# Patient Record
Sex: MALE | Race: WHITE | NOT HISPANIC OR LATINO | Employment: UNEMPLOYED | URBAN - METROPOLITAN AREA
[De-identification: names, ages, dates, MRNs, and addresses within clinical notes are randomized per-mention and may not be internally consistent; named-entity substitution may affect disease eponyms.]

---
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
## 2023-01-24 DIAGNOSIS — R03.0 ELEVATED BLOOD PRESSURE READING: Primary | ICD-10-CM

## 2023-02-01 ENCOUNTER — OFFICE VISIT (OUTPATIENT)
Dept: PEDIATRIC CARDIOLOGY | Facility: CLINIC | Age: 18
End: 2023-02-01
Payer: MEDICAID

## 2023-02-01 VITALS
RESPIRATION RATE: 18 BRPM | WEIGHT: 196.13 LBS | HEART RATE: 110 BPM | HEIGHT: 74 IN | DIASTOLIC BLOOD PRESSURE: 84 MMHG | BODY MASS INDEX: 25.17 KG/M2 | SYSTOLIC BLOOD PRESSURE: 158 MMHG | OXYGEN SATURATION: 99 %

## 2023-02-01 DIAGNOSIS — F41.9 ANXIETY: Primary | ICD-10-CM

## 2023-02-01 DIAGNOSIS — I10 HYPERTENSION, UNSPECIFIED TYPE: ICD-10-CM

## 2023-02-01 DIAGNOSIS — R94.31 NONSPECIFIC ABNORMAL ELECTROCARDIOGRAM (ECG) (EKG): ICD-10-CM

## 2023-02-01 PROCEDURE — 3079F DIAST BP 80-89 MM HG: CPT | Mod: CPTII,S$GLB,, | Performed by: PHYSICIAN ASSISTANT

## 2023-02-01 PROCEDURE — 3008F BODY MASS INDEX DOCD: CPT | Mod: CPTII,S$GLB,, | Performed by: PHYSICIAN ASSISTANT

## 2023-02-01 PROCEDURE — 1160F RVW MEDS BY RX/DR IN RCRD: CPT | Mod: CPTII,S$GLB,, | Performed by: PHYSICIAN ASSISTANT

## 2023-02-01 PROCEDURE — 3077F PR MOST RECENT SYSTOLIC BLOOD PRESSURE >= 140 MM HG: ICD-10-PCS | Mod: CPTII,S$GLB,, | Performed by: PHYSICIAN ASSISTANT

## 2023-02-01 PROCEDURE — 99204 OFFICE O/P NEW MOD 45 MIN: CPT | Mod: 25,S$GLB,, | Performed by: PHYSICIAN ASSISTANT

## 2023-02-01 PROCEDURE — 3079F PR MOST RECENT DIASTOLIC BLOOD PRESSURE 80-89 MM HG: ICD-10-PCS | Mod: CPTII,S$GLB,, | Performed by: PHYSICIAN ASSISTANT

## 2023-02-01 PROCEDURE — 1159F MED LIST DOCD IN RCRD: CPT | Mod: CPTII,S$GLB,, | Performed by: PHYSICIAN ASSISTANT

## 2023-02-01 PROCEDURE — 93000 EKG 12-LEAD: ICD-10-PCS | Mod: S$GLB,,, | Performed by: PEDIATRICS

## 2023-02-01 PROCEDURE — 1160F PR REVIEW ALL MEDS BY PRESCRIBER/CLIN PHARMACIST DOCUMENTED: ICD-10-PCS | Mod: CPTII,S$GLB,, | Performed by: PHYSICIAN ASSISTANT

## 2023-02-01 PROCEDURE — 3008F PR BODY MASS INDEX (BMI) DOCUMENTED: ICD-10-PCS | Mod: CPTII,S$GLB,, | Performed by: PHYSICIAN ASSISTANT

## 2023-02-01 PROCEDURE — 1159F PR MEDICATION LIST DOCUMENTED IN MEDICAL RECORD: ICD-10-PCS | Mod: CPTII,S$GLB,, | Performed by: PHYSICIAN ASSISTANT

## 2023-02-01 PROCEDURE — 99204 PR OFFICE/OUTPT VISIT, NEW, LEVL IV, 45-59 MIN: ICD-10-PCS | Mod: 25,S$GLB,, | Performed by: PHYSICIAN ASSISTANT

## 2023-02-01 PROCEDURE — 3077F SYST BP >= 140 MM HG: CPT | Mod: CPTII,S$GLB,, | Performed by: PHYSICIAN ASSISTANT

## 2023-02-01 PROCEDURE — 93000 ELECTROCARDIOGRAM COMPLETE: CPT | Mod: S$GLB,,, | Performed by: PEDIATRICS

## 2023-02-01 RX ORDER — LOSARTAN POTASSIUM 25 MG/1
25 TABLET ORAL 2 TIMES DAILY
Qty: 60 TABLET | Refills: 1 | Status: SHIPPED | OUTPATIENT
Start: 2023-02-01 | End: 2023-04-18

## 2023-02-01 NOTE — PROGRESS NOTES
[facility]  Chinedu Dennison  2005    Chinedu Dennison is a 17 y.o. male presenting for evaluation of HTN.  Chinedu is here today with his MGM.    HPI  Chinedu Dennison presented to his PCP 6/8/21 for STD results. The note states he had HTN but did not want to check his BP or take medication. His BP was 167/100. He was referred here but missed his appointment in June 2021. He returned to his PCP 12/12/22 requesting cardiology referral for HTN. He also had a history of anxiety. His BP was 150/80. He was started 12.5 mg of HCTZ but did not start it.   He reports his BP was first noted to be elevated 3 years ago. He took a BP medication for 4 days but stopped it because he thought his BP was normal at home (140 mmHg systolic).      Chinedu has been overall healthy. No history of kidney infection or kidney disease.  Chinedu has a lot of energy and does not get short of breath with activity.  Denies any recent illness, surgeries, or hospitalizations.    There are no reports of chest pain, chest pain with exertion, cyanosis, exercise intolerance, dyspnea, fatigue, palpitations, syncope, and tachypnea. No other cardiovascular or medical concerns are reported.      Medications:    Current Outpatient Medications   Medication Sig    losartan (COZAAR) 25 MG tablet Take 1 tablet (25 mg total) by mouth 2 (two) times a day.     No current facility-administered medications for this visit.       Allergies:   Review of patient's allergies indicates:   Allergen Reactions    Peanut butter [peanut]      Family History   Problem Relation Age of Onset    No Known Problems Mother     Hypertension Maternal Grandmother 31    Heart attacks under age 50 Maternal Grandmother 52    Hypertension Maternal Grandfather     Anemia Neg Hx     Arrhythmia Neg Hx     Cardiomyopathy Neg Hx     Childhood respiratory disease Neg Hx     Clotting disorder Neg Hx     Congenital heart disease Neg Hx     Deafness Neg Hx     Early death  "Neg Hx     Long QT syndrome Neg Hx     Pacemaker/defibrilator Neg Hx     Premature birth Neg Hx     Seizures Neg Hx     SIDS Neg Hx      Past Medical History:   Diagnosis Date    Hypertension     Respiratory syncytial virus (RSV)      Social History     Social History Narrative    Chinedu live with grandmother, Chinedu entering into the [employer]. No smoking      Past Surgical History:   Procedure Laterality Date    NO PAST SURGERIES       No birth history on file.    There is no immunization history on file for this patient.  Immunizations were reviewed today and if not current, recommend follow up with the PCP for further management.  Past medical history, family history, surgical history, social history updated and reviewed today.     Review of Systems  GENERAL: No fever, chills, fatigability, malaise, or weight loss.  CHEST: Denies CHANDLER, cyanosis, wheezing, cough, sputum production, or SOB.  CARDIOVASCULAR: Denies chest pain, palpitations, diaphoresis, SOB, or reduced exercise tolerance.  Endocrine: Denies polyphagia, polydipsia, or polyuria  Skin: Denies rashes or color change  HENT: Negative for congestion, headaches and sore throat.   ABDOMEN: Appetite fine. No weight loss. Denies diarrhea, abdominal pain, nausea, or vomiting.  PERIPHERAL VASCULAR: No edema, varicosities, or cyanosis.  Musculoskeletal: Negative for muscle weakness and stiffness.  NEUROLOGIC: no dizziness, no history of syncope by report, no headache   Psychiatric/Behavioral: Negative for altered mental status. The patient is not nervous/anxious.   Allergic/Immunologic: Negative for environmental allergies.   : dysuria, hematuria, polyuria    Objective:   BP (!) 158/84   Pulse 110   Resp 18   Ht 6' 2" (1.88 m)   Wt 89 kg (196 lb 1.6 oz)   SpO2 99%   BMI 25.18 kg/m²   Body surface area is 2.16 meters squared.  Blood pressure percentiles are not available for patients who are 18 years or older.    Vitals:    02/01/23 1517 02/01/23 1557 " "  BP: (!) 176/90 (!) 158/84   BP Location: Right arm    Patient Position: Lying    BP Method: X-Large (Manual)    Pulse: 110    Resp: 18    SpO2: 99%    Weight: 89 kg (196 lb 1.6 oz)    Height: 6' 2" (1.88 m)        Physical Exam  GENERAL: Awake, well-developed well-nourished, no apparent distress  HEENT: mucous membranes moist and pink, normocephalic, no cranial or carotid bruits, sclera anicteric  NECK:  no lymphadenopathy  CHEST: Good air movement, clear to auscultation bilaterally  CARDIOVASCULAR: Quiet precordium, regular rate and rhythm, single S1, split S2, normal P2, No S3 or S4, no rubs or gallops. No clicks or rumbles. No cardiomegaly by palpation. No murmur noted. No flank or abdominal bruits.   ABDOMEN: Soft, nontender nondistended, no hepatosplenomegaly, no aortic bruits  EXTREMITIES: Warm well perfused, 2+ radial/pedal/femoral pulses, capillary refill 2 seconds, no clubbing, cyanosis, or edema  NEURO: Alert and oriented, cooperative with exam, face symmetric, moves all extremities well.  Skin: pink, turgor WNL  Vitals reviewed     Tests:   Today's EKG interpretation by Dr. Augustine reveals:   NSR  Gothic T waves Deep S V2  (Final report in electronic medical record)    CXR:   Dr. Augustine personally reviewed the radiographic images of the chest dated 3/23/21 and the findings are:  Levocardia with a normal heart size, normal pulmonary flow and situs solitus of the abdominal organs and Lateral view is within normal limits    Assessment:  Patient Active Problem List   Diagnosis    Hypertension    Anxiety    Nonspecific abnormal electrocardiogram (ECG) (EKG)       Discussion/ Plan:   Dr. Augustine reviewed history and physical exam. He then performed the physical exam. He discussed the findings with the patient's caregiver(s), and answered all questions. Dr. Augustine and I have reviewed our general guidelines related to cardiac issues with the family.  I instructed them in the event of an emergency to call 911 or go to the " nearest emergency room.  They know to contact the PCP if problems arise or if they are in doubt.    Chinedu has uncontrolled stage 2 HTN . He has been hypertensive for 3 years per report. He is agreeable with work up and treatment because he plans to join the [employer]. Because his BP is stage 2, Dr. Augustine would like to start Losartan 25 mg BID today. Discussed possible adverse reactions including angioedema, cough, etc. Will start HTN work up( see below). Once BP is controlled, will plan for stress test before clearing for normal activity. He should follow a low sodium healthy diet. His EKG nonspecific but likely a normal variant. Will do echo for evaluation.  He will check his BP at home and alert us if consistently elevated.       Normal BP Systolic BP <120 and diastolic BP <80 mmHg   Elevated BP Systolic  to 129 and diastolic BP <80 mmHg   Stage 1 /80 to 139/89 mmHg   Stage 2 HTN ?140/90 mmHg     Follow up with PCP for anxiety.    I spent a total of 45 minutes on the day of the visit.  This includes face to face time and non-face to face time preparing to see the patient (eg, review of tests), obtaining and/or reviewing separately obtained history, documenting clinical information in the electronic or other health record, independently interpreting results and communicating results to the patient/family/caregiver, or care coordinator.     Activity:He can participate in normal age-appropriate activities. He should be allowed to set .his own pace and rest if fatigued.     No endocarditis prophylaxis is recommended in this circumstance.      Medications:    Current Outpatient Medications   Medication Sig    losartan (COZAAR) 25 MG tablet Take 1 tablet (25 mg total) by mouth 2 (two) times a day.     No current facility-administered medications for this visit.        Orders placed this encounter  Orders Placed This Encounter   Procedures    US Doppler Renal Artery and Vein (xpd)    US Kidney    Aldosterone     T4, Free    TSH    Urinalysis    Renin    Comprehensive Metabolic Panel    Lipid Panel    Uric Acid    Pediatric Echo Limited Echo? No       Follow-Up:   Return to clinic 1 week after testing with EKG or sooner if there are any concerns    Sincerely,  Alexandro Augustine MD    Note Contributing Authors:  MD Loren Lane PA-C  02/01/2023    Attestation: Alexandro Augustine MD  I have reviewed the records and agree with the above. I have examined the patient and discussed the findings with the family in attendance. All questions were answered to their satisfaction. I agree with the plan and the follow up instructions.

## 2023-02-01 NOTE — PATIENT INSTRUCTIONS
Alexandro Augustine MD  Pediatric Cardiology  [address] [place] [id number]  [phone]    General Guidelines    Name: Chinedu Dennison                   : 2005    Diagnosis:   1. Anxiety    2. Hypertension, unspecified type    3. Nonspecific abnormal electrocardiogram (ECG) (EKG)        PCP: Lynsey Haque NP  PCP Phone Number: [phone]    If you have an emergency or you think you have an emergency, go to the nearest emergency room!     Breathing too fast, doesnt look right, consistently not eating well, your child needs to be checked. These are general indications that your child is not feeling well. This may be caused by anything, a stomach virus, an ear ache or heart disease, so please call Lynsey Haque NP. If Lynsey Haque NP thinks you need to be checked for your heart, they will let us know.     If your child experiences a rapid or very slow heart rate and has the following symptoms, call Lynsey Haque NP or go to the nearest emergency room.   unexplained chest pain   does not look right   feels like they are going to pass out   actually passes out for unexplained reasons   weakness or fatigue   shortness of breath  or breathing fast   consistent poor feeding     If your child experiences a rapid or very slow heart rate that lasts longer than 30 minutes call Lynsey Haque NP or go to the nearest emergency room.     If your child feels like they are going to pass out - have them sit down or lay down immediately. Raise the feet above the head (prop the feet on a chair or the wall) until the feeling passes. Slowly allow the child to sit, then stand. If the feeling returns, lay back down and start over.     It is very important that you notify Lynsey Haque NP first. Lnysey Haque NP or the ER Physician can reach Dr. Alexandro Augustine at the office or through [facility] at [phone] as needed.    Call our office ([phone]) one week after ALL tests for  results.       Normal BP Systolic BP <120 and diastolic BP <80 mmHg   Elevated BP Systolic  to 129 and diastolic BP <80 mmHg   Stage 1 /80 to 139/89 mmHg   Stage 2 HTN ?140/90 mmHg

## 2023-02-10 DIAGNOSIS — R94.31 NONSPECIFIC ABNORMAL ELECTROCARDIOGRAM (ECG) (EKG): ICD-10-CM

## 2023-02-10 DIAGNOSIS — I10 HYPERTENSION, UNSPECIFIED TYPE: Primary | ICD-10-CM

## 2023-02-10 DIAGNOSIS — F41.9 ANXIETY: ICD-10-CM

## 2023-02-21 ENCOUNTER — TELEPHONE (OUTPATIENT)
Dept: PEDIATRIC CARDIOLOGY | Facility: CLINIC | Age: 18
End: 2023-02-21
Payer: MEDICAID

## 2023-02-21 NOTE — TELEPHONE ENCOUNTER
Called [facility] and was told that they would accept his medicaid plan. Referral faxed for review/scheduling.

## 2023-02-21 NOTE — TELEPHONE ENCOUNTER
----- Message from Loren Jovel PA-C sent at 2/21/2023  4:23 PM CST -----  Please refer to adult nephrology locally for abnormal renal US suspect for renal artery stenosis and HTN. Thanks.

## 2023-02-21 NOTE — TELEPHONE ENCOUNTER
Tried to call again to go over renal US with doppler results. It was suspect for renal artery stenosis. Dr. Augustine would like to refer him to adult nephrology locally since he is an adult.  Also wanted to check on labs to see if they were done. No answer.  Will continue to try to call.       Addendum 2/21/2023. Spoke to Chinedu. Reviewed results and plan.  Will refer to nephrology.  He will call if he does not hear from them about an appointment.  He will have labs done tomorrow.     Message  Received: Today  LITA Cruzy Staff  Please refer to adult nephrology locally for abnormal renal US suspect for renal artery stenosis and HTN. Thanks.     2/14/23  USV DUPLEX ABDOMEN PELVIC OR RETROPERITONEAL LIMITED     REASON FOR STUDY/DIAGNOSIS: I10:Essential (primary) hypertension.     COMPARISON: None.     TECHNIQUE: Real-time grayscale and color Doppler sonographic imaging was performed of the kidneys and urinary bladder. Static images are submitted for review.     FINDINGS:   Right kidney measures 11.1 x 5.0 x 5.5 cm. Left kidney measures 11.8 x 6.4 x 5.4 cm in length. No hydronephrosis. Normal cortical thickness bilaterally. No hydronephrosis. No discrete nephrolithiasis or suspicious renal mass seen sonographically.     Vasculature (peak systolic velocities are reported in centimeters per second):   Abdominal aorta: Patent and normal caliber. Peak systolic velocities 177, 137, and 122 cm/s at the proximal, mid, and distal aspects respectively.     RIGHT KIDNEY:   Renal artery peak systolic velocities 134, 134, and 93 cm/s at the proximal, mid, and distal aspects respectively. Renal-aortic ratio is within normal limits. Renal vein is patent. Interlobar renal artery resistive indices measure 0.28, 0.55, and 0.47 at the upper pole, interpolar, and lower pole respectively.     LEFT KIDNEY:   Renal artery peak systolic velocities 90, 58, and 77 cm/sec at the proximal, mid, and distal aspects respectively.  Renal-aortic ratio is within normal limits. Renal vein is patent. Interlobar renal artery resistive indices measure 0.61, 0.51, and 0.50 at the upper pole, interpolar, and lower pole respectively.      Impression    1.  Nonspecific decreased interlobar renal artery resistive indices (right worse than left) and can be seen with renal artery stenosis, but there is no other sonographic evidence to support this.   2.  No hydronephrosis.            Echo 2/14/23  INTERPRETATION SUMMARY   4 Chambers with normally aligned great vessels   Qualitatively normal chamber sizes   No LVH noted   EF (TEICH) 66, 71:%   MV E/A:2.0   Good LV Function   No LVOTO   No RVOTO   Aortic Valve Normal   Pulmonary Valve Normal   Mitral Valve Normal   Tricuspid Valve Normal   Aortic Root Appears Normal   Aortic Arch Appears Normal   Descending Aorta Appears Normal   No evidence of coarctation of the aorta noted   Desc Ao PG 7 mmHg   RCA and LCA ostia are patent by 2D   Normal MPA & branches are normal   No PPS   3 of 4 pulmonary veins noted draining to LA   No shunts noted   LA qualitatively normal for age   TAPSE 2.2 cm   Physiological TR, PI, MR   RVSP ~10 mmHg   IVC and SVC to RA   Clinical Correlation Suggested   Follow-up Warranted

## 2023-02-23 ENCOUNTER — DOCUMENTATION ONLY (OUTPATIENT)
Dept: PEDIATRIC CARDIOLOGY | Facility: CLINIC | Age: 18
End: 2023-02-23

## 2023-02-23 ENCOUNTER — OFFICE VISIT (OUTPATIENT)
Dept: PEDIATRIC CARDIOLOGY | Facility: CLINIC | Age: 18
End: 2023-02-23
Payer: MEDICAID

## 2023-02-23 VITALS
HEIGHT: 74 IN | HEART RATE: 86 BPM | RESPIRATION RATE: 18 BRPM | WEIGHT: 202.63 LBS | DIASTOLIC BLOOD PRESSURE: 74 MMHG | OXYGEN SATURATION: 98 % | SYSTOLIC BLOOD PRESSURE: 150 MMHG | BODY MASS INDEX: 26 KG/M2

## 2023-02-23 DIAGNOSIS — F41.9 ANXIETY: ICD-10-CM

## 2023-02-23 DIAGNOSIS — R94.31 ABNORMAL EKG: ICD-10-CM

## 2023-02-23 DIAGNOSIS — I10 HYPERTENSION, UNSPECIFIED TYPE: ICD-10-CM

## 2023-02-23 DIAGNOSIS — R93.89 ABNORMAL ULTRASOUND: Primary | ICD-10-CM

## 2023-02-23 LAB
ALBUMIN SERPL-MCNC: 4.4 G/DL (ref 3.5–5)
ALBUMIN/GLOB SERPL: 1.2 {RATIO} (ref 1.2–2.2)
ALP SERPL-CCNC: 58 IU/L (ref 40–150)
ALT SERPL-CCNC: 38 IU/L (ref 5–50)
APPEARANCE UR: CLEAR
AST SERPL-CCNC: 18 IU/L (ref 10–58)
BILIRUB SERPL-MCNC: 0.7 MG/DL (ref 0.2–1.2)
BILIRUB UR QL STRIP: NEGATIVE
BUN SERPL-MCNC: 11 MG/DL (ref 5–25)
BUN/CREAT SERPL: 10.6
CALCIUM SERPL-MCNC: 10.3 MG/DL (ref 8.8–10.6)
CHLORIDE SERPL-SCNC: 103 MMOL/L (ref 100–109)
CHOLEST SERPL-MCNC: 178 MG/DL (ref 0–199)
CO2 SERPL-SCNC: 29 MMOL/L (ref 22–33)
COLOR UR: YELLOW
CREAT SERPL-MCNC: 1.04 MG/DL (ref 0.57–1.25)
GLOBULIN SER CALC-MCNC: 3.6 G/DL
GLUCOSE SERPL-MCNC: 93 MG/DL (ref 70–100)
GLUCOSE UR QL STRIP: NEGATIVE
HDLC SERPL-MCNC: 40 MG/DL
HGB UR QL STRIP: NEGATIVE
KETONES UR QL STRIP: NEGATIVE
LDLC SERPL CALC-MCNC: 124 MG/DL (ref 0–109)
LEUKOCYTE ESTERASE UR QL STRIP: NEGATIVE
NITRITE UR QL STRIP: NEGATIVE
PH UR STRIP: 6 [PH] (ref 5.5–6.5)
POTASSIUM SERPL-SCNC: 4.5 MMOL/L (ref 3.5–5.1)
PROT SERPL-MCNC: 8 G/DL (ref 6–8.3)
PROT UR QL STRIP: ABNORMAL
SODIUM SERPL-SCNC: 141 MMOL/L (ref 136–145)
SP GR UR STRIP: ABNORMAL (ref 1–1.03)
TRIGL SERPL-MCNC: 72 MG/DL (ref 0–150)
URATE SERPL-MCNC: 7.1 MG/DL (ref 3.5–7.2)
UROBILINOGEN UR STRIP-MCNC: 0.2 MG/DL (ref 0.2–1)
VLDLC SERPL CALC-MCNC: 14 MG/DL (ref 5–40)

## 2023-02-23 PROCEDURE — 93000 EKG 12-LEAD: ICD-10-PCS | Mod: S$GLB,,, | Performed by: PEDIATRICS

## 2023-02-23 PROCEDURE — 1159F MED LIST DOCD IN RCRD: CPT | Mod: CPTII,S$GLB,, | Performed by: NURSE PRACTITIONER

## 2023-02-23 PROCEDURE — 99214 OFFICE O/P EST MOD 30 MIN: CPT | Mod: 25,S$GLB,, | Performed by: NURSE PRACTITIONER

## 2023-02-23 PROCEDURE — 99214 PR OFFICE/OUTPT VISIT, EST, LEVL IV, 30-39 MIN: ICD-10-PCS | Mod: 25,S$GLB,, | Performed by: NURSE PRACTITIONER

## 2023-02-23 PROCEDURE — 3078F DIAST BP <80 MM HG: CPT | Mod: CPTII,S$GLB,, | Performed by: NURSE PRACTITIONER

## 2023-02-23 PROCEDURE — 1159F PR MEDICATION LIST DOCUMENTED IN MEDICAL RECORD: ICD-10-PCS | Mod: CPTII,S$GLB,, | Performed by: NURSE PRACTITIONER

## 2023-02-23 PROCEDURE — 3008F PR BODY MASS INDEX (BMI) DOCUMENTED: ICD-10-PCS | Mod: CPTII,S$GLB,, | Performed by: NURSE PRACTITIONER

## 2023-02-23 PROCEDURE — 3008F BODY MASS INDEX DOCD: CPT | Mod: CPTII,S$GLB,, | Performed by: NURSE PRACTITIONER

## 2023-02-23 PROCEDURE — 3077F PR MOST RECENT SYSTOLIC BLOOD PRESSURE >= 140 MM HG: ICD-10-PCS | Mod: CPTII,S$GLB,, | Performed by: NURSE PRACTITIONER

## 2023-02-23 PROCEDURE — 1160F RVW MEDS BY RX/DR IN RCRD: CPT | Mod: CPTII,S$GLB,, | Performed by: NURSE PRACTITIONER

## 2023-02-23 PROCEDURE — 93000 ELECTROCARDIOGRAM COMPLETE: CPT | Mod: S$GLB,,, | Performed by: PEDIATRICS

## 2023-02-23 PROCEDURE — 4010F PR ACE/ARB THEARPY RXD/TAKEN: ICD-10-PCS | Mod: CPTII,S$GLB,, | Performed by: NURSE PRACTITIONER

## 2023-02-23 PROCEDURE — 3078F PR MOST RECENT DIASTOLIC BLOOD PRESSURE < 80 MM HG: ICD-10-PCS | Mod: CPTII,S$GLB,, | Performed by: NURSE PRACTITIONER

## 2023-02-23 PROCEDURE — 4010F ACE/ARB THERAPY RXD/TAKEN: CPT | Mod: CPTII,S$GLB,, | Performed by: NURSE PRACTITIONER

## 2023-02-23 PROCEDURE — 3077F SYST BP >= 140 MM HG: CPT | Mod: CPTII,S$GLB,, | Performed by: NURSE PRACTITIONER

## 2023-02-23 PROCEDURE — 1160F PR REVIEW ALL MEDS BY PRESCRIBER/CLIN PHARMACIST DOCUMENTED: ICD-10-PCS | Mod: CPTII,S$GLB,, | Performed by: NURSE PRACTITIONER

## 2023-02-23 NOTE — PROGRESS NOTES
[facility]  Chinedu Dennison  2005    Chinedu Dennison is a 17 y.o. male presenting for follow-up of HTN, anxiety, and non specific EKG changes.  Chinedu is here today with his grandparent.    HPI  Chinedu Dennison was initially sent for cardiac evaluation 2/1/23 for HTN. He saw his PCP in June of 2021. The note states he had HTN but did not want to check his BP or take medication. His BP was 167/100. He was referred here but missed his appointment in June 2021. He returned to his PCP 12/12/22 requesting cardiology referral for HTN. He also had a history of anxiety. His BP was 150/80. He was started 12.5 mg of HCTZ but did not start it.   He reports his BP was first noted to be elevated 3 years ago. He took a BP medication for 4 days but stopped it because he thought his BP was normal at home (140 mmHg systolic).     He was last seen at his initial visit and at that time was doing well with no complaints. His exam that day revealed normal heart sounds and no murmur. EKG with Gothic T waves, deep S V2. CXR was normal. B/p was elevated.  Losartan 25 mg twice a day was started.  Low-sodium diet was recommended.  Echo was ordered as well as home blood pressures.  Hypertension workup was ordered including renal Doppler, kidney ultrasound, aldosterone, renin, TSH, T4, UA, CMP, lipid panel, uric acid.  He was asked to follow up 1 week after the testing.     Chinedu has been doing well since last visit. Chinedu has a lot of energy and does not get short of breath with activity. Denies any recent illness, surgeries, or hospitalizations.  He did not have any lab work done.  He has not had his blood pressure medicine this morning.  He stated he took it 11:00 p.m. last night and wanted to wait till 11:00 p.m. today, 12 hours.    There are no reports of chest pain, chest pain with exertion, cyanosis, exercise intolerance, dyspnea, fatigue, palpitations, syncope, and tachypnea. No other cardiovascular or  medical concerns are reported.     Current Medications:   Current Outpatient Medications on File Prior to Visit   Medication Sig Dispense Refill    losartan (COZAAR) 25 MG tablet Take 1 tablet (25 mg total) by mouth 2 (two) times a day. 60 tablet 1     No current facility-administered medications on file prior to visit.     Allergies:   Review of patient's allergies indicates:   Allergen Reactions    Peanut butter [peanut]          Family History   Problem Relation Age of Onset    No Known Problems Mother     Hypertension Maternal Grandmother 31    Heart attacks under age 50 Maternal Grandmother 52    Hypertension Maternal Grandfather     Anemia Neg Hx     Arrhythmia Neg Hx     Cardiomyopathy Neg Hx     Childhood respiratory disease Neg Hx     Clotting disorder Neg Hx     Congenital heart disease Neg Hx     Deafness Neg Hx     Early death Neg Hx     Long QT syndrome Neg Hx     Pacemaker/defibrilator Neg Hx     Premature birth Neg Hx     Seizures Neg Hx     SIDS Neg Hx      Past Medical History:   Diagnosis Date    Anxiety     Hypertension     Nonspecific abnormal electrocardiogram (ECG) (EKG)     Respiratory syncytial virus (RSV), history of      Social History     Socioeconomic History    Marital status: Single   Social History Narrative    Chinedu live with grandmother, Chinedu entering into the [employer]. No smoking     Past Surgical History:   Procedure Laterality Date    NO PAST SURGERIES         Review of Systems    GENERAL: No fever, chills, fatigability, malaise  or weight loss.  CHEST: Denies dyspnea on exertion, cyanosis, wheezing, cough, sputum production   CARDIOVASCULAR: Denies chest pain, palpitations, diaphoresis,  or reduced exercise tolerance.  ABDOMEN: Appetite normal. Denies diarrhea, abdominal pain, nausea or vomiting.  PERIPHERAL VASCULAR: No edema or cyanosis.  NEUROLOGIC: no dizziness, no syncope , no headache   MUSCULOSKELETAL: Denies muscle weakness, joint pain  PSYCHOLOGICAL/BEHAVIORAL:  "Denies anxiety, severe stress, confusion  SKIN: no rashes, lesions  HEMATOLOGIC: Denies any abnormal bruising or bleeding  ALLERGY/IMMUNOLOGIC: Denies any environmental allergies.     Objective:   BP (!) 150/74 (BP Location: Right arm, Patient Position: Lying, BP Method: Large (Manual)) Comment: After 10 minutes.  Pulse 86   Resp 18   Ht 6' 1.62" (1.87 m)   Wt 91.9 kg (202 lb 9.6 oz)   SpO2 98%   BMI 26.28 kg/m²     Physical Exam  GENERAL: Awake, well-developed well-nourished, no apparent distress  HEENT: mucous membranes moist and pink, normocephalic, no cranial or carotid bruits, sclera anicteric  CHEST: Good air movement, clear to auscultation bilaterally  CARDIOVASCULAR: Quiet precordium, regular rhythm, single S1, split S2, normal P2, No S3 or S4, no rub. No clicks or rumbles. No cardiomegaly by palpation. No murmur.   ABDOMEN: Soft, nontender nondistended, no hepatosplenomegaly, no aortic bruits  EXTREMITIES: Warm well perfused, 2+ brachial/femoral pulses, capillary refill <3 seconds, no clubbing, cyanosis, or edema  NEURO: Alert and oriented, cooperative with exam, face symmetric, moves all extremities well.    Tests:   Today's EKG interpretation by Dr. Augustine reveals:   Sinus Rhythm  Tall R  V5-6, Deep S V2, possible LVH  Mild coving  (Final report in electronic medical record)    Echocardiogram:   Pertinent findings from the Echo dated 2/14/23 are:   4 Chambers with normally aligned great vessels  Qualitatively normal chamber sizes  No LVH noted  EF (TEICH) 66, 71:%  MV E/A:2.0  Good LV Function  No LVOTO  No RVOTO  Aortic Valve Normal  Pulmonary Valve Normal  Mitral Valve Normal  Tricuspid Valve Normal  Aortic Root Appears Normal  Aortic Arch Appears Normal  Descending Aorta Appears Normal  No evidence of coarctation of the aorta noted   Desc Ao PG 7 mmHg  RCA and LCA ostia are patent by 2D  Normal MPA & branches are normal  No PPS  3 of 4 pulmonary veins noted draining to LA  No shunts noted   LA " qualitatively normal for age   TAPSE 2.2 cm   Physiological TR, PI, MR   RVSP ~10 mmHg   IVC and SVC to RA  Clinical Correlation Suggested  Follow-up Warranted   (Full report in electronic medical record)    Renal doppler:  IMPRESSION:  1.  Nonspecific decreased interlobar renal artery resistive indices (right worse than left) and can be seen with renal artery stenosis, but there is no other sonographic evidence to support this.  2.  No hydronephrosis.      Assessment:  1. Hypertension, unspecified type    2. Nonspecific abnormal electrocardiogram (ECG) (EKG)    3. Anxiety        Discussion/Plan:   Chinedu Dennison is a 17 y.o. male with hypertension.  His exam is normal.  EKG suggests LVH but he has a recent normal echo.  His renal Doppler was not perfectly normal so he has been referred to adult Nephrology.  His blood pressure is elevated today but he has not taken his medication.  We had a long discussion about the need to be compliant with the medications due to the long-term effects of hypertension on the for organ systems..  He and his grandmother are in agreement.  They will begin checking blood pressures at home and report if still elevated.  They will go today to have the lab work done.  He has not eaten yet.  We will plan for follow-up 1st available to reassess his blood pressure.    I have reviewed our general guidelines related to cardiac issues with the family.  I instructed them in the event of an emergency to call 911 or go to the nearest emergency room.  They know to contact the PCP if problems arise or if they are in doubt. The patient should see a dentist every 6 months for routine dental care.    Follow up with the primary care provider for the following issues: Nothing identified.    Activity:He can participate in normal age-appropriate activities. He should be allowed to set his own pace and rest if fatigued.    No endocarditis prophylaxis is recommended in this circumstance.     I spent over  30 minutes with the patient. Over 50% of the time was spent counseling the patient and family member.    Patient or family member was asked to call the office within 3 days of any testing for results.     Dr. Augustine reviewed history and physical exam. He then performed the physical exam. He discussed the findings with the patient's caregiver(s), and answered all questions. I have reviewed our general guidelines related to cardiac issues with the family. I instructed them in the event of an emergency to call 911 or go to the nearest emergency room. They know to contact the PCP if problems arise or if they are in doubt.    Medications:   Current Outpatient Medications   Medication Sig    losartan (COZAAR) 25 MG tablet Take 1 tablet (25 mg total) by mouth 2 (two) times a day.     No current facility-administered medications for this visit.      Orders:   No orders of the defined types were placed in this encounter.    Follow-Up:     Return to clinic first available with EKG or sooner if there are any concerns.       Sincerely,  Alexandro Augustine MD    Note Contributing Authors:  MD John Lane FNP-ELIZABETH  This documentation was created using Vigme voice recognition software. Content is subject to voice recognition errors.    02/23/2023    Attestation: Alexandro Augustine MD    I have reviewed the records and agree with the above.

## 2023-02-23 NOTE — PROGRESS NOTES
Labs today.  CMP is normal, trace protein on UA, LDL mildly elevated.  Uric acid is normal.  Renin, aldosterone, thyroid studies pending.  Discussed results with grandmother.  Encouraged her to call if she had not heard from Nephrology within the next week.

## 2023-02-23 NOTE — PATIENT INSTRUCTIONS
Alexandro Augustine MD  Pediatric Cardiology  [address] [place] [id number]  [phone]    General Guidelines    Name: Chinedu Dennison                   : 2005    Diagnosis:   1. Abnormal ultrasound    2. Hypertension, unspecified type    3. Abnormal EKG    4. Anxiety        PCP: Lynsey Haque NP (Inactive)  PCP Phone Number: [phone]    If you have an emergency or you think you have an emergency, go to the nearest emergency room!     Breathing too fast, doesnt look right, consistently not eating well, your child needs to be checked. These are general indications that your child is not feeling well. This may be caused by anything, a stomach virus, an ear ache or heart disease, so please call Lynsey Haque NP (Inactive). If Lynsey Haque NP (Inactive) thinks you need to be checked for your heart, they will let us know.     If your child experiences a rapid or very slow heart rate and has the following symptoms, call Lynsey Haque NP (Inactive) or go to the nearest emergency room.   unexplained chest pain   does not look right   feels like they are going to pass out   actually passes out for unexplained reasons   weakness or fatigue   shortness of breath  or breathing fast   consistent poor feeding     If your child experiences a rapid or very slow heart rate that lasts longer than 30 minutes call Lynsey Haque NP (Inactive) or go to the nearest emergency room.     If your child feels like they are going to pass out - have them sit down or lay down immediately. Raise the feet above the head (prop the feet on a chair or the wall) until the feeling passes. Slowly allow the child to sit, then stand. If the feeling returns, lay back down and start over.     It is very important that you notify Lynsey Haque NP (Inactive) first. Lynsey Haque NP (Inactive) or the ER Physician can reach Dr. Alexandro Augustine at the office or through [facility] at [phone] as  needed.    Call our office ([phone]) one week after ALL tests for results.

## 2023-03-05 LAB
ALDOST SERPL-MCNC: 2.9 NG/DL (ref 0–30)
ALDOST/RENIN PLAS-RTO: 0.5 {RATIO} (ref 0–30)
RENIN PLAS-CCNC: 6.31 NG/ML/HR (ref 0.17–5.38)
T4 FREE SERPL-MCNC: 1.45 NG/DL (ref 0.93–1.6)
TSH SERPL DL<=0.005 MIU/L-ACNC: 3.59 UIU/ML (ref 0.45–4.5)

## 2023-04-05 DIAGNOSIS — R94.31 NONSPECIFIC ABNORMAL ELECTROCARDIOGRAM (ECG) (EKG): ICD-10-CM

## 2023-04-05 DIAGNOSIS — I10 HYPERTENSION, UNSPECIFIED TYPE: Primary | ICD-10-CM

## 2023-04-20 ENCOUNTER — TELEPHONE (OUTPATIENT)
Dept: PEDIATRIC CARDIOLOGY | Facility: CLINIC | Age: 18
End: 2023-04-20

## 2023-04-20 NOTE — TELEPHONE ENCOUNTER
----- Message from Shilpa Love RN sent at 4/20/2023  3:27 PM CDT -----  Regarding: NS'd 04/20/2023- DIRKK  Okay to RS to first available appt. If no answer, please mail a letter to the family asking them to call and RS the missed appt.    Thank you

## 2023-04-20 NOTE — LETTER
[facility]  [address] [facility] [address]  Phone: [phone]  Fax: [phone]       Date: 2023    Re: Chinedu Dennison  : 2005      To the guardian of Chinedu Dennison:    We are sorry that Chinedu missed his appointment for a follow up on 2023. Chinedu's health and follow-up medical care are important to us. Please call our office as soon as possible at [phone] so that we may reschedule his appointment and update your contact information. If you have already rescheduled Chinedu's appointment, please disregard this letter.    Sincerely,    Alexandro Augustine MD and staff

## 2023-06-06 NOTE — TELEPHONE ENCOUNTER
03/13/2023: Fax sent to [facility] to check on the status of referral.     03/14/2023: Office asked for referral data to be re-faxed.    03/17/2023: Referral data received. It is in the pile for Dr. Lopez to review. He is not there on a daily basis and reviews the referrals in the order received. MLP updated.

## 2023-06-06 NOTE — TELEPHONE ENCOUNTER
06/06/2023: Called to check on the status of the referral- was informed that the data has been lost/thrown away and asked for the referral to be faxed again. I verbalizes by concerns since we have been waiting to hear about if they will accept the patient due to his age since referral was first faxed on 02/21/2023. She said she would do her best to make sure it is seen in a timely manner since the date on the referral is from 02/21/2023. Referral faxed now for the 3rd time.

## 2023-06-06 NOTE — TELEPHONE ENCOUNTER
Update received from the [facility]- appt now scheduled for 06/20/2023 at 9am. They will mail a letter to the address provided with appt details but said we can call as well.    Unable to reach patient/family by the 3 numbers listed on the chart- Letter with appt information for cardiology and nephrology appts mailed to the address on file.

## 2023-06-13 ENCOUNTER — TELEPHONE (OUTPATIENT)
Dept: PEDIATRIC CARDIOLOGY | Facility: CLINIC | Age: 18
End: 2023-06-13